# Patient Record
Sex: MALE | Race: ASIAN | NOT HISPANIC OR LATINO
[De-identification: names, ages, dates, MRNs, and addresses within clinical notes are randomized per-mention and may not be internally consistent; named-entity substitution may affect disease eponyms.]

---

## 2020-01-10 ENCOUNTER — APPOINTMENT (OUTPATIENT)
Dept: OPHTHALMOLOGY | Facility: CLINIC | Age: 55
End: 2020-01-10

## 2021-04-07 ENCOUNTER — APPOINTMENT (OUTPATIENT)
Dept: ORTHOPEDIC SURGERY | Facility: CLINIC | Age: 56
End: 2021-04-07
Payer: MEDICAID

## 2021-04-07 VITALS
HEART RATE: 65 BPM | HEIGHT: 66 IN | DIASTOLIC BLOOD PRESSURE: 79 MMHG | WEIGHT: 138 LBS | BODY MASS INDEX: 22.18 KG/M2 | SYSTOLIC BLOOD PRESSURE: 116 MMHG

## 2021-04-07 DIAGNOSIS — M54.16 RADICULOPATHY, LUMBAR REGION: ICD-10-CM

## 2021-04-07 PROBLEM — Z00.00 ENCOUNTER FOR PREVENTIVE HEALTH EXAMINATION: Status: ACTIVE | Noted: 2021-04-07

## 2021-04-07 PROCEDURE — 99203 OFFICE O/P NEW LOW 30 MIN: CPT

## 2021-04-07 PROCEDURE — 99072 ADDL SUPL MATRL&STAF TM PHE: CPT

## 2021-04-16 PROBLEM — M54.16 LUMBAR RADICULOPATHY: Status: ACTIVE | Noted: 2021-04-16

## 2021-04-16 NOTE — PHYSICAL EXAM
[Antalgic] : antalgic [de-identified] : Examination of the lumbar spine reveals no midline tenderness palpation, step-offs, or skin lesions. Decreased range of motion with respect to flexion, extension, lateral bending, and rotation. No tenderness to palpation of the sciatic notch. No tenderness palpation of the bilateral greater trochanters. No pain with passive internal/external rotation of the hips. No instability of bilateral lower extremities.  Negative STEPHENIE. Negative straight leg raise bilaterally. No bowstring. Negative femoral stretch. 5 out of 5 iliopsoas, hip abductors, hips adductors, quadriceps, hamstrings, gastrocsoleus, tibialis anterior, extensor hallucis longus, peroneals. Grossly intact sensation to light touch bilateral lower extremities. 1+ patellar and Achilles reflexes. Downgoing Babinski. No clonus. Intact proprioception. Palpable pulses. No skin lesion and no edema on the right and left lower extremities.

## 2021-04-16 NOTE — DISCUSSION/SUMMARY
[de-identified] : Given his worsening symptoms a lumbar spine MRI will be obtained.  Follow-up afterwards.

## 2021-04-16 NOTE — HISTORY OF PRESENT ILLNESS
[de-identified] : Mr. WILLOW WILSON  is a 56 year old male who presents with 6 weeks of right leg pain that is not improving.  His pain is in his right buttock, lateral thigh, and lateral calf.  He has done 6 weeks of physical therapy without any relief.  Nsaids do not help.  Normal bowel and bladder control.   Denies any recent fevers, chills, sweats, weight loss, or infection.\par \par The patients past medical history, past surgical history, medications, allergies, and social history were reviewed by me today with the patient and documented accordingly.  In addition, the patient's family history, which is noncontributory to their visit, was also reviewed.\par

## 2021-04-21 ENCOUNTER — APPOINTMENT (OUTPATIENT)
Dept: MRI IMAGING | Facility: CLINIC | Age: 56
End: 2021-04-21

## 2022-11-10 ENCOUNTER — APPOINTMENT (OUTPATIENT)
Dept: OPHTHALMOLOGY | Facility: CLINIC | Age: 57
End: 2022-11-10

## 2022-11-10 ENCOUNTER — NON-APPOINTMENT (OUTPATIENT)
Age: 57
End: 2022-11-10

## 2022-11-10 PROCEDURE — 92004 COMPRE OPH EXAM NEW PT 1/>: CPT

## 2023-01-18 ENCOUNTER — EMERGENCY (EMERGENCY)
Facility: HOSPITAL | Age: 58
LOS: 1 days | Discharge: LEFT BEFORE DISCHARGE | End: 2023-01-18
Attending: EMERGENCY MEDICINE | Admitting: EMERGENCY MEDICINE
Payer: COMMERCIAL

## 2023-01-18 VITALS
WEIGHT: 139.99 LBS | SYSTOLIC BLOOD PRESSURE: 126 MMHG | TEMPERATURE: 99 F | RESPIRATION RATE: 17 BRPM | OXYGEN SATURATION: 96 % | HEART RATE: 71 BPM | DIASTOLIC BLOOD PRESSURE: 80 MMHG | HEIGHT: 66 IN

## 2023-01-18 PROCEDURE — 99283 EMERGENCY DEPT VISIT LOW MDM: CPT | Mod: 25

## 2023-01-18 PROCEDURE — 73630 X-RAY EXAM OF FOOT: CPT | Mod: 26,RT

## 2023-01-18 PROCEDURE — 99284 EMERGENCY DEPT VISIT MOD MDM: CPT

## 2023-01-18 PROCEDURE — 73630 X-RAY EXAM OF FOOT: CPT

## 2023-01-18 RX ORDER — IBUPROFEN 200 MG
600 TABLET ORAL ONCE
Refills: 0 | Status: COMPLETED | OUTPATIENT
Start: 2023-01-18 | End: 2023-01-18

## 2023-01-18 RX ADMIN — Medication 600 MILLIGRAM(S): at 18:11

## 2023-01-18 RX ADMIN — Medication 600 MILLIGRAM(S): at 18:41

## 2023-01-18 NOTE — ED ADULT TRIAGE NOTE - CHIEF COMPLAINT QUOTE
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 patient reports he worked the rest of the day as usual, but when he drove home the pain became too severe so he came to the hospital.

## 2023-01-18 NOTE — ED PROVIDER NOTE - OBJECTIVE STATEMENT
57 M c/o right foot pain. Was at work and a jacqui fell onto his heel. At first was feeling okay, finished work. When driving home pain got worse. Denies other injuries. No medication taken for pain.

## 2023-01-18 NOTE — ED PROVIDER NOTE - NSFOLLOWUPINSTRUCTIONS_ED_ALL_ED_FT
Tylenol, ibuprofen.  Rest.  Splint, crutches.  Follow up with podiatrist.  Return for worsening or concerning symptoms.          A clavicle fracture is a break in the long bone that connects your shoulder to your chest wall (clavicle). The clavicle is also called the collarbone. This is a common injury that can happen at any age.      What are the causes?    Common causes of this condition include:  •A hard, direct hit to the shoulder.      •A motor vehicle accident.       •A fall.        What increases the risk?    You are more likely to develop this condition if:  •You are younger than 25 years of age or older than 75 years of age. Most clavicle fractures happen to people who are younger than 25 years of age.      •You are male.      •You play contact sports.        What are the signs or symptoms?    Symptoms of this condition include:  •Pain near the injured shoulder and in the arm.      •Trouble moving the arm on your injured side.      •A shoulder that drops downward and forward.      •Pain when you try to lift the shoulder.      •Bruising, swelling, and tenderness over your shoulder.      •A grinding noise when you try to move the shoulder.      •A bump over your injured shoulder.        How is this treated?    Treatment for this condition depends on the injury.  •If the broken ends of the bone are not out of place, your doctor may put your arm in a sling.      •If the broken ends of the bone are out of place, you may need surgery to put the bones back together.      You may be given medicines for pain.    You may need to do physical therapy exercises to help your shoulder move better and get stronger after your injury has healed.      Follow these instructions at home:    Medicines     •Take over-the-counter and prescription medicines only as told by your doctor.    •Ask your doctor if the medicine prescribed to you:  •Requires you to avoid driving or using machinery.    •Can cause trouble pooping (constipation). You may need to take these actions to prevent or treat trouble pooping:   •Drink enough fluid to keep your pee (urine) pale yellow.      •Take over-the-counter or prescription medicines.      •Eat foods that are high in fiber. These include beans, whole grains, and fresh fruits and vegetables.       •Limit foods that are high in fat and sugars. These include fried or sweet foods.          If you have a sling:     •Wear the sling as told by your doctor. Remove it only as told by your doctor.    •Loosen it if your fingers:  •Tingle.      •Become numb.      •Turn cold and blue.        • Do not lift your arm. Keep it across your chest.      •Keep the sling clean.    •Ask your doctor if you may remove the sling when you take a bath or shower.  •If not, and the sling is not waterproof, do not let it get wet. Cover it with a watertight covering when you take a bath or shower.      •If you may remove it when you take a bath or shower, keep your shoulder in the same position as when the sling is on.          Managing pain, stiffness, and swelling    •If told, put ice on the injured area. To do this:  •If you have a removable sling, remove it as told by your doctor.      •Put ice in a plastic bag.      •Place a towel between your skin and the bag.      •Leave the ice on for 20 minutes, 2–3 times a day.        •Move your fingers often.      •Raise (elevate) the injured area above the level of your heart while you are sitting or lying down.        Activity      •Avoid activities that make your symptoms worse for 4–6 weeks, or as long as told.      • Do not lift anything that is heavier than 10 lb (4.5 kg), or the limit that you are told, until your doctor says that it is safe.      • Do not put weight through your arm on the injured side until your doctor says it is safe. Do not pull or push things or try to support your body weight with that arm.      •Ask your doctor when it is safe for you to drive.      •Do exercises as told by your doctor.      •Return to your normal activities as told by your doctor. Ask your doctor what activities are safe for you.      General instructions     • Do not use any products that contain nicotine or tobacco, such as cigarettes, e-cigarettes, and chewing tobacco. These can delay bone healing. If you need help quitting, ask your doctor.      •Keep all follow-up visits as told by your doctor. This is important.        Contact a doctor if:    •Your medicine is not making you feel less pain.      •Your medicine is not making swelling better.        Get help right away if:    •Your arm is numb. This means that you cannot feel it.      •Your arm is cold.      •Your arm is pale.        Summary    •A clavicle fracture is a break in the long bone that connects your shoulder to your chest wall.      •Treatment depends on whether the broken ends of the bone are out of place or not.      •If you have a sling, wear it as told by your doctor.      •Do exercises when your doctor says you can. The exercises will help your shoulder move better and get stronger.      This information is not intended to replace advice given to you by your health care provider. Make sure you discuss any questions you have with your health care provider.

## 2023-01-18 NOTE — ED PROVIDER NOTE - NS ED ATTENDING STATEMENT MOD
This was a shared visit with the AYAN. I reviewed and verified the documentation and independently performed the documented:

## 2023-01-18 NOTE — ED PROVIDER NOTE - CLINICAL SUMMARY MEDICAL DECISION MAKING FREE TEXT BOX
Patient is a 57-year-old male who presents emergency room with a chief complaint of right foot pain.  Patient reports no significant past medical history.  States he was at work and a jacqui fell onto his foot.  First he felt fine and was able to finish work but as he began to drive home the pain significantly worsened and is increased with weightbearing.  Denies any numbness or tingling to the foot denies additional injury.  He has not taken anything for the symptoms.  On exam patient is sitting in a chair no acute distress right foot: No significant tenderness to palpation to the ankle.  Tenderness to palpation overlying the midfoot sensation grossly intact cap refill less than 2 seconds positive pedal pulse.  Patient presenting to the emergency room with a chief complaint of right foot pain status post blunt trauma.  Neurovascularly intact.  Concern for possible fracture versus ligament injury versus sprain strain.  Will obtain x-ray medicate for pain and monitor.  X-ray of the foot reviewed.  There is a questionable avulsion fracture in that foot.  Patient has overlying pain will place in splints and provide crutches.  At this time patient refusing splint or crutches.  Would like a cane and would like to weight-bear.  Advised that this is not recommended as he could worsen an underlying injury.  Still refusing and will leave AMA.

## 2023-01-18 NOTE — ED PROVIDER NOTE - PATIENT PORTAL LINK FT
You can access the FollowMyHealth Patient Portal offered by [facility] by registering at the following website: [email or web address]. By joining [employer]’s FollowMyHealth portal, you will also be able to view your health information using other applications (apps) compatible with our system.

## 2023-01-18 NOTE — ED PROVIDER NOTE - PROGRESS NOTE DETAILS
Reevaluated patient at bedside.  Patient feeling well. Discussed the results of all diagnostic testing in ED and copies of all available reports given.   An opportunity to ask questions was provided.  Discussed the importance of prompt, close medical follow-up.  Patient will return with any changes, concerns or persistent/worsening symptoms.  Understanding of all instructions verbalized.    unable to input wet read - questionable tarsal fracture - splint, crutches, podiatry f/u pt now refusing splint or crutches. Advise that there is high suspicion for fracture and it is recommended that he be placed in a splint and be non weight bearing until follow up. Still refusing. Advised that if there is an underlying fracture weight bearing may worsen symptoms. Still refusing splint. Will leave AMA

## 2023-01-18 NOTE — ED PROVIDER NOTE - DIFFERENTIAL DIAGNOSIS
Concern for possible fracture versus ligament injury versus sprain strain.  Will obtain x-ray medicate for pain and monitor. Differential Diagnosis

## 2023-01-18 NOTE — ED PROVIDER NOTE - MUSCULOSKELETAL, MLM
TTP 5th metatarsal right foot, no ecchymosis, no swelling, no deformity; +NVI; ankle FROM; skin intact.

## 2023-01-18 NOTE — ED PROVIDER NOTE - CARE PROVIDER_API CALL
Austin Kruse (DPM)  Foot Surgery; Podiatric Medicine; Recon RearfootAnkle Surgery  [address] [place] [address]  Phone: [phone]  Fax: [phone]  Follow Up Time:

## 2023-01-19 ENCOUNTER — EMERGENCY (EMERGENCY)
Facility: HOSPITAL | Age: 58
LOS: 1 days | Discharge: HOME | End: 2023-01-19
Attending: EMERGENCY MEDICINE | Admitting: EMERGENCY MEDICINE
Payer: SELF-PAY

## 2023-01-19 VITALS
HEART RATE: 78 BPM | OXYGEN SATURATION: 98 % | SYSTOLIC BLOOD PRESSURE: 121 MMHG | HEIGHT: 66 IN | WEIGHT: 139.99 LBS | DIASTOLIC BLOOD PRESSURE: 77 MMHG | TEMPERATURE: 98 F | RESPIRATION RATE: 18 BRPM

## 2023-01-19 PROCEDURE — 99283 EMERGENCY DEPT VISIT LOW MDM: CPT | Mod: 25

## 2023-01-19 PROCEDURE — 29515 APPLICATION SHORT LEG SPLINT: CPT | Mod: RT

## 2023-01-19 PROCEDURE — 29515 APPLICATION SHORT LEG SPLINT: CPT

## 2023-01-19 PROCEDURE — 99282 EMERGENCY DEPT VISIT SF MDM: CPT | Mod: 25

## 2023-01-19 NOTE — ED PROVIDER NOTE - OBJECTIVE STATEMENT
58 yo M presents requesting splint and crutches. pt was seen yesterday at  ed and was advised to have a splint and crutches placed but pt refused and left AMA. pt had an accident at work where an object fell on his right foot. pt is c/o same pain to right foot.

## 2023-01-19 NOTE — ED ADULT TRIAGE NOTE - CHIEF COMPLAINT QUOTE
Pt was seen yesterday for right foot injury and was d/c'd but refused cast/brace.  Pt is requesting cast/brace today.

## 2023-01-19 NOTE — ED ADULT NURSE NOTE - OBJECTIVE STATEMENT
10:10-Pt presented to ER, sts he is back to have cast placed o  R foot, dx with Fracture yesterday but left ama before cast was placed, now agreeable to have in done, denies pain/distress, awaiting eval by Provider. CARLOS Magana

## 2023-01-19 NOTE — ED PROVIDER NOTE - PATIENT PORTAL LINK FT
You can access the FollowMyHealth Patient Portal offered by [facility] by registering at the following website: [email or web address]. By joining [employer]’s FollowMyHealth portal, you will also be able to view your health information using other applications (apps) compatible with our system.

## 2023-01-19 NOTE — ED PROCEDURE NOTE - CPROC ED POST RADIOGRAPHY1
post procedure radiography not performed
Instructed patient/caregiver to follow-up with primary care physician./Verbal/written post procedure instructions were given to patient/caregiver./Instructed patient/caregiver regarding signs and symptoms of infection./Keep the cast/splint/dressing clean and dry.

## 2023-01-19 NOTE — ED PROVIDER NOTE - CARE PROVIDER_API CALL
Ja Barrios (DPM)  Podiatric Medicine and Surgery  [address] [place] [address]  Phone: [phone]  Fax: [phone]  Follow Up Time: 4-6 Days

## 2023-01-19 NOTE — ED PROVIDER NOTE - PHYSICAL EXAMINATION
Constitutional: Awake, Alert. NAD. Well appearing, well nourished. Cooperative. VSS.  HEAD: NC/AT; no signs of trauma   EYES: Conjunctiva and sclera are clear bilaterally.   ENT: MMM. No rhinorrhea, normal pharynx, oropharynx patent, no tonsillar exudates or enlargement, no erythema, no drooling or stridor.   NECK: FROM. Supple.   CARDIOVASCULAR: RRR  RESPIRATORY: Speaking full sentences. Normal respiratory effort  ABDOMEN: Soft; NTND.   EXTREMITIES: Full active ROM in all extremities; no deformities; non-tender to palpation; no edema, no crepitus or step off;  distal pulses palpable and symmetric.  SKIN: Warm, dry; good skin turgor, no apparent lesions or rashes, no ecchymosis, brisk capillary refill.  NEURO: AAOx3 follows commands. No facial droop. Steady gait, using cane. Moving all extrem spont

## 2023-01-19 NOTE — ED PROVIDER NOTE - CARE PROVIDERS DIRECT ADDRESSES
[email or web address] in the [email or web address] EOAE (evoked otoacoustic emission)

## 2023-01-19 NOTE — ED PROVIDER NOTE - CLINICAL SUMMARY MEDICAL DECISION MAKING FREE TEXT BOX
58 yo M presents requesting splint and crutches, was at  ED yesterday and advised to have a splint but refused. now is reqestuing it  vss  +nvi b/l feet  nontender right foot    short leg spint placed  dc w pmd and podiatry f/u  Based on the H&P, I do not suspect any life- / limb- threatening pathology that requires further intervention at this time.

## 2023-12-06 NOTE — ED ADULT TRIAGE NOTE - IDEAL BODY WEIGHT(KG)
Is This A New Presentation, Or A Follow-Up?: Skin Lesions How Severe Is Your Skin Lesion?: moderate Have Your Skin Lesions Been Treated?: not been treated Additional History: New patient. 62

## 2024-08-23 PROBLEM — Z78.9 OTHER SPECIFIED HEALTH STATUS: Chronic | Status: ACTIVE | Noted: 2023-01-18

## 2024-09-03 ENCOUNTER — EMERGENCY (EMERGENCY)
Facility: HOSPITAL | Age: 59
LOS: 1 days | Discharge: HOME | End: 2024-09-03
Attending: STUDENT IN AN ORGANIZED HEALTH CARE EDUCATION/TRAINING PROGRAM
Payer: COMMERCIAL

## 2024-09-03 VITALS
SYSTOLIC BLOOD PRESSURE: 115 MMHG | DIASTOLIC BLOOD PRESSURE: 74 MMHG | HEIGHT: 66 IN | HEART RATE: 76 BPM | TEMPERATURE: 98 F | RESPIRATION RATE: 18 BRPM | OXYGEN SATURATION: 98 % | WEIGHT: 141.1 LBS

## 2024-09-03 VITALS
SYSTOLIC BLOOD PRESSURE: 116 MMHG | DIASTOLIC BLOOD PRESSURE: 77 MMHG | OXYGEN SATURATION: 100 % | TEMPERATURE: 98 F | HEART RATE: 50 BPM | RESPIRATION RATE: 18 BRPM

## 2024-09-03 PROCEDURE — 99284 EMERGENCY DEPT VISIT MOD MDM: CPT

## 2024-09-03 PROCEDURE — 73630 X-RAY EXAM OF FOOT: CPT

## 2024-09-03 PROCEDURE — 99283 EMERGENCY DEPT VISIT LOW MDM: CPT

## 2024-09-03 PROCEDURE — 73630 X-RAY EXAM OF FOOT: CPT | Mod: 26,RT

## 2024-09-03 RX ORDER — IBUPROFEN 600 MG
600 TABLET ORAL ONCE
Refills: 0 | Status: COMPLETED | OUTPATIENT
Start: 2024-09-03 | End: 2024-09-03

## 2024-09-03 RX ADMIN — Medication 600 MILLIGRAM(S): at 14:55

## 2024-09-03 NOTE — ED PROVIDER NOTE - CLINICAL SUMMARY MEDICAL DECISION MAKING FREE TEXT BOX
right foot pain, atraumatic  has had h/o FX in same foot x 1 year ago, injury with pole in construction accident  check XR  NSAIDS  outpatient f/u with podiatry

## 2024-09-03 NOTE — ED PROVIDER NOTE - PATIENT PORTAL LINK FT
You can access the FollowMyHealth Patient Portal offered by [facility] by registering at the following website: [email or web address]. By joining [employer]’s FollowMyHealth portal, you will also be able to view your health information using other applications (apps) compatible with our system.

## 2024-09-03 NOTE — ED PROVIDER NOTE - CARE PROVIDER_API CALL
Abel Davis  Podiatric Medicine and Surgery  [address] [place] [id number]  Phone: [phone]  Fax: [phone]  Follow Up Time:

## 2024-09-03 NOTE — ED PROVIDER NOTE - PHYSICAL EXAMINATION
Constitutional - well-developed; well nourished. Head - NCAT. Airway patent. Eyes - PERRL. CV - RRR. no murmur. no edema. Pulm - CTAB. Abd - soft, nt. no rebound. no guarding. Neuro - A&Ox3. strength 5/5 x4. sensation intact x4. normal gait. Skin - No rash. MSK - +TTP to anterior mid foot, no erythema, no warmth, no swelling, no echymosis, NTTP calf or knee, compartments soft NT, normal ROM.

## 2024-09-03 NOTE — ED ADULT NURSE NOTE - OBJECTIVE STATEMENT
Orders   OUTPATIENT DIAGNOSTIC TESTING  ULTRASOUND OF THE ABDOMEN COMPLETE  DIAGNOSIS: ABDOMINAL TENDERNESS, LLQ R10.814, BLOATING R14.0           [address] [address]  [phone] (p)  [phone] (f)           1 VISIT.  Signature   Electronically signed by : Dariela Welch ; 07/26/2017 12:32 PM CST; Co-participant.  Electronically signed by : BRUCE BERNHEIM M.D.; 07/26/2017 12:41 PM CST.   assumed care of pt at 1415. c/o foot pain since last year.. denies numbness or tingling. pt educated on plan of care, pt able to successfully teach back plan of care to RN, RN will continue to reeducate pt during hospital stay.

## 2024-09-03 NOTE — ED ADULT TRIAGE NOTE - CHIEF COMPLAINT QUOTE
pt c/o of R foot pain. pt states he had an injury last year and recently thje foot pain started aga8in. pt states the pain comes and goes but woke up today and was "bad" pt ambulatory in ED

## 2024-09-03 NOTE — ED PROVIDER NOTE - OBJECTIVE STATEMENT
This a 59 year old male here for right foot pain evaluation.  He notes had injury with pole in construction 1 year ago and was told had fracture and LE was placed in splint.  He notes no trauma or falls to affected foot recently.  He denies taking anything for pain.  He denies any calf pain, knee pain or hip pain.  He denies any SOB.  He denies any pmhx or shx.

## 2024-09-03 NOTE — ED PROVIDER NOTE - ATTENDING APP SHARED VISIT CONTRIBUTION OF CARE
I, Daniel Yu, evaluated the patient with the PA, and completed the key components of the history and physical exam. I then discussed the management plan with the PA.

## 2024-09-03 NOTE — ED PROVIDER NOTE - NS ED ROS FT
No fever/chills, No photophobia/eye pain/changes in vision, No ear pain/sore throat/dysphagia, No chest pain/palpitations, no SOB/cough/wheeze/stridor, No abdominal pain, No N/V/D, no dysuria/frequency/discharge, +right foot pain, No neck/back pain, no rash, no changes in neurological status/function.

## 2025-01-20 ENCOUNTER — EMERGENCY (EMERGENCY)
Facility: HOSPITAL | Age: 60
LOS: 1 days | Discharge: HOME | End: 2025-01-20
Attending: STUDENT IN AN ORGANIZED HEALTH CARE EDUCATION/TRAINING PROGRAM | Admitting: STUDENT IN AN ORGANIZED HEALTH CARE EDUCATION/TRAINING PROGRAM
Payer: MEDICAID

## 2025-01-20 VITALS
DIASTOLIC BLOOD PRESSURE: 80 MMHG | TEMPERATURE: 98 F | HEIGHT: 67 IN | OXYGEN SATURATION: 98 % | WEIGHT: 139.99 LBS | RESPIRATION RATE: 18 BRPM | SYSTOLIC BLOOD PRESSURE: 121 MMHG | HEART RATE: 78 BPM

## 2025-01-20 VITALS
HEART RATE: 77 BPM | DIASTOLIC BLOOD PRESSURE: 84 MMHG | OXYGEN SATURATION: 98 % | SYSTOLIC BLOOD PRESSURE: 123 MMHG | RESPIRATION RATE: 17 BRPM | TEMPERATURE: 98 F

## 2025-01-20 PROCEDURE — 99283 EMERGENCY DEPT VISIT LOW MDM: CPT | Mod: 25

## 2025-01-20 PROCEDURE — 73564 X-RAY EXAM KNEE 4 OR MORE: CPT

## 2025-01-20 PROCEDURE — 73564 X-RAY EXAM KNEE 4 OR MORE: CPT | Mod: 26,RT

## 2025-01-20 PROCEDURE — 99284 EMERGENCY DEPT VISIT MOD MDM: CPT

## 2025-01-20 RX ORDER — IBUPROFEN 200 MG
600 TABLET ORAL ONCE
Refills: 0 | Status: COMPLETED | OUTPATIENT
Start: 2025-01-20 | End: 2025-01-20

## 2025-01-20 RX ORDER — IBUPROFEN 200 MG
1 TABLET ORAL
Qty: 21 | Refills: 0
Start: 2025-01-20 | End: 2025-01-26

## 2025-01-20 RX ADMIN — Medication 600 MILLIGRAM(S): at 15:14

## 2025-01-20 RX ADMIN — Medication 600 MILLIGRAM(S): at 14:48

## 2025-01-20 NOTE — ED PROVIDER NOTE - CARE PROVIDER_API CALL
Sam Goodwin  Orthopaedic Surgery  [address] [place] [id number]  Phone: [phone]  Fax: [phone]  Follow Up Time:

## 2025-01-20 NOTE — ED ADULT NURSE NOTE - OBJECTIVE STATEMENT
Pt ambulatory to ED c/o right knee pain. Pt states the pain started yesterday and has worsened to today./ Pt denies any trauma to area, but states he had a right foot injury x2 years ago. Pt denies any numbness tingling to right leg, chest pain, SOB, n/v/d fever or chills. No swelling, warmth or redness noted to knee. Pt remains resting in stretcher.

## 2025-01-20 NOTE — ED PROVIDER NOTE - CLINICAL SUMMARY MEDICAL DECISION MAKING FREE TEXT BOX
patient is a 59-year-old male with no significant past medical history presents emergency department for right knee pain.  He states this been going on since yesterday.  Patient works as a .  This is never happened before.  Denies fever, chills.  Patient has been ambulating with a cane since yesterday secondary to pain in the knee.  Patient is able to range the knee.  Denies numbness, weakness.  Denies erythema.    General: well appearing, no acute distress, appropriate weight  Cardiac: heart RRR, no murmurs, rubs, gallops, pulses 2+ x4  Pulm: lungs CTA sensation intact, strength 5/5  Skin: warm, dry, no rash, laceration, abrasion, contusion  MSK: R knee swelling. no erythema or warmth. FROM. distal sensation intact. distal pulse 2+. neg anterior-posterior drawer test. neg varus/valgus test.    will obtain xray and mediate for pain. may require arthrocentesis.

## 2025-01-20 NOTE — ED PROVIDER NOTE - CARE PROVIDERS DIRECT ADDRESSES
[email or web address]

## 2025-01-20 NOTE — ED PROVIDER NOTE - DIFFERENTIAL DIAGNOSIS
DDx includes but not limited to: Bursitis VS fracture VS less likely gout.  Do not suspect septic joint Differential Diagnosis

## 2025-01-20 NOTE — ED PROVIDER NOTE - NSFOLLOWUPINSTRUCTIONS_ED_ALL_ED_FT
Follow up with orthopedics  rest ice elevate compress  take ibuprofen for pain     Knee Pain    WHAT YOU NEED TO KNOW:    Knee pain may start suddenly, or it may be a long-term problem. You may have pain on the side, front, or back of your knee. You may have knee stiffness and swelling. You may hear popping sounds or feel like your knee is giving way or locking up as you walk. You may feel pain when you sit, stand, walk, or climb up and down stairs. Knee pain can be caused by conditions such as obesity, inflammation, or strains or tears in ligaments or tendons.    DISCHARGE INSTRUCTIONS:    Return to the emergency department if:    Your pain is worse, even after treatment.    You cannot bend or straighten your leg completely.    The swelling around your knee does not go down even with treatment.    Your knee is painful and hot to the touch.  Contact your healthcare provider if:    You have questions or concerns about your condition or care.    Medicines: You may need any of the following:    NSAIDs help decrease swelling and pain or fever. This medicine is available with or without a doctor's order. NSAIDs can cause stomach bleeding or kidney problems in certain people. If you take blood thinner medicine, always ask your healthcare provider if NSAIDs are safe for you. Always read the medicine label and follow directions.    Acetaminophen decreases pain and fever. It is available without a doctor's order. Ask how much to take and how often to take it. Follow directions. Read the labels of all other medicines you are using to see if they also contain acetaminophen, or ask your doctor or pharmacist. Acetaminophen can cause liver damage if not taken correctly.    Prescription pain medicine may be given. Ask your healthcare provider how to take this medicine safely. Some prescription pain medicines contain acetaminophen. Do not take other medicines that contain acetaminophen without talking to your healthcare provider. Too much acetaminophen may cause liver damage. Prescription pain medicine may cause constipation. Ask your healthcare provider how to prevent or treat constipation.    Take your medicine as directed. Contact your healthcare provider if you think your medicine is not helping or if you have side effects. Tell your provider if you are allergic to any medicine. Keep a list of the medicines, vitamins, and herbs you take. Include the amounts, and when and why you take them. Bring the list or the pill bottles to follow-up visits. Carry your medicine list with you in case of an emergency.  What you can do to manage your symptoms:    Rest your knee so it can heal. Limit activities that increase your pain. Do low-impact exercises, such as walking or swimming.    Apply ice to help reduce swelling and pain. Use an ice pack, or put crushed ice in a plastic bag. Cover it with a towel before you apply it to your knee. Apply ice for 15 to 20 minutes every hour, or as directed.    Apply compression to help reduce swelling. Use a brace or bandage only as directed.    Elevate your knee to help decrease pain and swelling. Elevate your knee while you are sitting or lying down. Prop your leg on pillows to keep your knee above the level of your heart.    Prevent your knee from moving as directed. Your healthcare provider may put on a cast or splint. You may need to wear a leg brace to stabilize your knee. A leg brace can be adjusted to increase your range of motion as your knee heals.  Hinged Knee Braces   What you can do to prevent knee pain:    Maintain a healthy weight. Extra weight increases your risk for knee pain. Ask your healthcare provider how much you should weigh. He or she can help you create a safe weight loss plan if you need to lose weight.    Exercise or train properly. Use the correct equipment for sports. Wear shoes that provide good support. Check your posture often as you exercise, play sports, or train for an event. This can help prevent stress and strain on your knees. Rest between sessions so you do not overwork your knees.  Follow up with your healthcare provider within 24 hours or as directed: You may need follow-up treatments, such as steroid injections to decrease pain. Write down your questions so you remember to ask them during your visits.

## 2025-01-20 NOTE — ED PROVIDER NOTE - PATIENT PORTAL LINK FT
You can access the FollowMyHealth Patient Portal offered by [facility] by registering at the following website: [email or web address]. By joining [employer]’s FollowMyHealth portal, you will also be able to view your health information using other applications (apps) compatible with our system.

## 2025-01-20 NOTE — ED PROVIDER NOTE - OBJECTIVE STATEMENT
Patient is a 59-year-old otherwise healthy male coming in complaining of right knee pain since yesterday.  Patient states he works as a  went for a walk yesterday was fine and symptoms started after that.  Patient has been using a cane denies falling twisting numbness tingling weakness chest pain shortness of breath.  Patient denies taking any for pain.

## 2025-01-20 NOTE — ED PROVIDER NOTE - PROGRESS NOTE DETAILS
Dr. Stevenson Hyman  Trace fluid around the right knee on POCUS.  Patient is feeling better after ibuprofen.  X-ray unremarkable.  Will discharge patient home with follow-up to PCP, orthopedics and give return precautions.

## 2025-01-26 PROBLEM — M25.561 RIGHT KNEE PAIN, UNSPECIFIED CHRONICITY: Status: ACTIVE | Noted: 2025-01-26

## 2025-01-27 ENCOUNTER — APPOINTMENT (OUTPATIENT)
Dept: ORTHOPEDIC SURGERY | Facility: CLINIC | Age: 60
End: 2025-01-27
Payer: MEDICAID

## 2025-01-27 VITALS
DIASTOLIC BLOOD PRESSURE: 78 MMHG | BODY MASS INDEX: 22.18 KG/M2 | WEIGHT: 138 LBS | HEIGHT: 66 IN | SYSTOLIC BLOOD PRESSURE: 125 MMHG

## 2025-01-27 PROBLEM — M17.11 ARTHRITIS OF KNEE, RIGHT: Status: ACTIVE | Noted: 2025-01-27

## 2025-01-27 PROCEDURE — 73564 X-RAY EXAM KNEE 4 OR MORE: CPT | Mod: RT

## 2025-01-27 PROCEDURE — 99204 OFFICE O/P NEW MOD 45 MIN: CPT | Mod: 25

## 2025-01-27 PROCEDURE — 20610 DRAIN/INJ JOINT/BURSA W/O US: CPT | Mod: RT

## 2025-01-27 RX ORDER — MELOXICAM 15 MG/1
15 TABLET ORAL
Qty: 30 | Refills: 0 | Status: ACTIVE | COMMUNITY
Start: 2025-01-27 | End: 1900-01-01

## 2025-02-05 ENCOUNTER — APPOINTMENT (OUTPATIENT)
Dept: ORTHOPEDIC SURGERY | Facility: CLINIC | Age: 60
End: 2025-02-05
Payer: MEDICAID

## 2025-02-05 VITALS
HEART RATE: 63 BPM | WEIGHT: 138 LBS | DIASTOLIC BLOOD PRESSURE: 81 MMHG | HEIGHT: 66 IN | BODY MASS INDEX: 22.18 KG/M2 | SYSTOLIC BLOOD PRESSURE: 119 MMHG

## 2025-02-05 DIAGNOSIS — M17.11 UNILATERAL PRIMARY OSTEOARTHRITIS, RIGHT KNEE: ICD-10-CM

## 2025-02-05 PROCEDURE — G2211 COMPLEX E/M VISIT ADD ON: CPT | Mod: NC

## 2025-02-05 PROCEDURE — 99213 OFFICE O/P EST LOW 20 MIN: CPT

## 2025-02-13 ENCOUNTER — APPOINTMENT (OUTPATIENT)
Dept: MRI IMAGING | Facility: CLINIC | Age: 60
End: 2025-02-13
Payer: MEDICAID

## 2025-02-13 ENCOUNTER — OUTPATIENT (OUTPATIENT)
Dept: OUTPATIENT SERVICES | Facility: HOSPITAL | Age: 60
LOS: 1 days | End: 2025-02-13

## 2025-02-13 DIAGNOSIS — M17.11 UNILATERAL PRIMARY OSTEOARTHRITIS, RIGHT KNEE: ICD-10-CM

## 2025-02-13 PROCEDURE — 73721 MRI JNT OF LWR EXTRE W/O DYE: CPT | Mod: 26,RT

## 2025-03-10 ENCOUNTER — APPOINTMENT (OUTPATIENT)
Dept: ORTHOPEDIC SURGERY | Facility: CLINIC | Age: 60
End: 2025-03-10
Payer: MEDICAID

## 2025-03-10 VITALS — WEIGHT: 138 LBS | HEIGHT: 66 IN | BODY MASS INDEX: 22.18 KG/M2

## 2025-03-10 DIAGNOSIS — S83.289A OTHER TEAR OF LATERAL MENISCUS, CURRENT INJURY, UNSPECIFIED KNEE, INITIAL ENCOUNTER: ICD-10-CM

## 2025-03-10 PROCEDURE — 99213 OFFICE O/P EST LOW 20 MIN: CPT

## 2025-03-10 PROCEDURE — G2211 COMPLEX E/M VISIT ADD ON: CPT | Mod: NC

## 2025-04-06 ENCOUNTER — EMERGENCY (EMERGENCY)
Facility: HOSPITAL | Age: 60
LOS: 1 days | End: 2025-04-06
Attending: STUDENT IN AN ORGANIZED HEALTH CARE EDUCATION/TRAINING PROGRAM
Payer: COMMERCIAL

## 2025-04-06 VITALS
SYSTOLIC BLOOD PRESSURE: 120 MMHG | TEMPERATURE: 98 F | OXYGEN SATURATION: 99 % | DIASTOLIC BLOOD PRESSURE: 71 MMHG | RESPIRATION RATE: 18 BRPM | WEIGHT: 145.06 LBS | HEART RATE: 61 BPM

## 2025-04-06 VITALS
SYSTOLIC BLOOD PRESSURE: 118 MMHG | HEART RATE: 64 BPM | OXYGEN SATURATION: 99 % | RESPIRATION RATE: 18 BRPM | TEMPERATURE: 98 F | DIASTOLIC BLOOD PRESSURE: 70 MMHG

## 2025-04-06 PROCEDURE — 99284 EMERGENCY DEPT VISIT MOD MDM: CPT

## 2025-04-06 PROCEDURE — 99284 EMERGENCY DEPT VISIT MOD MDM: CPT | Mod: 25

## 2025-04-06 PROCEDURE — 72131 CT LUMBAR SPINE W/O DYE: CPT | Mod: 26

## 2025-04-06 PROCEDURE — 72131 CT LUMBAR SPINE W/O DYE: CPT | Mod: MC

## 2025-04-06 RX ORDER — ACETAMINOPHEN 500 MG/5ML
650 LIQUID (ML) ORAL ONCE
Refills: 0 | Status: COMPLETED | OUTPATIENT
Start: 2025-04-06 | End: 2025-04-06

## 2025-04-06 RX ORDER — LIDOCAINE HYDROCHLORIDE 20 MG/ML
1 JELLY TOPICAL ONCE
Refills: 0 | Status: COMPLETED | OUTPATIENT
Start: 2025-04-06 | End: 2025-04-06

## 2025-04-06 RX ORDER — METHOCARBAMOL 500 MG/1
1500 TABLET, FILM COATED ORAL ONCE
Refills: 0 | Status: COMPLETED | OUTPATIENT
Start: 2025-04-06 | End: 2025-04-06

## 2025-04-06 RX ORDER — METHOCARBAMOL 500 MG/1
2 TABLET, FILM COATED ORAL
Qty: 12 | Refills: 0
Start: 2025-04-06 | End: 2025-04-07

## 2025-04-06 RX ORDER — IBUPROFEN 200 MG
400 TABLET ORAL ONCE
Refills: 0 | Status: COMPLETED | OUTPATIENT
Start: 2025-04-06 | End: 2025-04-06

## 2025-04-06 RX ADMIN — LIDOCAINE HYDROCHLORIDE 1 PATCH: 20 JELLY TOPICAL at 17:58

## 2025-04-06 RX ADMIN — Medication 650 MILLIGRAM(S): at 17:56

## 2025-04-06 RX ADMIN — METHOCARBAMOL 1500 MILLIGRAM(S): 500 TABLET, FILM COATED ORAL at 17:57

## 2025-04-06 RX ADMIN — Medication 400 MILLIGRAM(S): at 17:57

## 2025-04-06 NOTE — ED PROVIDER NOTE - OBJECTIVE STATEMENT
60-year-old male no significant past medical history presents with 2 days of diffuse lower back pain.  Patient states he went fishing was helping his wife clean the fish on the floor and he was sitting for about 1/2-hour and when he stood up he noticed severe pain in his back.  Is able to ambulate but with pain.  No bowel or bladder incontinence, no saddle anesthesia, no numbness tingling weakness, no fevers.  Has not taken anything for pain at home.

## 2025-04-06 NOTE — ED PROVIDER NOTE - NSFOLLOWUPINSTRUCTIONS_ED_ALL_ED_FT
Please follow-up with the spine clinic as below within 24 to 48 hours.      You may take ACETAMINOPHEN and IBUPROFEN as directed on packaging. Always follow medication instructions and read medication side effects. Stop using these medications if you have any concerns You may take Robaxin as needed for breakthrough pain, 2 tabs every 8 hours as needed for 2 days.  This may make you sleepy so please do not drive or operate machinery while taking this medication.    Please return to the emergency department for worsening back pain numbness tingling weakness difficulty with your bowels or bladder difficulty walking numbness in the groin if you get worse in any way    Back Pain    AMBULATORY CARE:    Back pain is common. You may have back pain and muscle spasms. You may feel sore or stiff on one or both sides of your back. The pain may spread to your lower body. Conditions that affect the spine, joints, or muscles can cause back pain. These may include arthritis, spinal stenosis (narrowing of the spinal column), muscle tension, or breakdown of the spinal discs.    Call your local emergency number (911 in the ) if:    You have severe back pain with chest pain.    You cannot control your urine or bowel movements.    Your pain becomes so severe that you cannot walk.  Seek care immediately if:    You have pain, numbness, or weakness in one or both legs.    You have severe back pain, nausea, and vomiting.    You have severe back pain that spreads to your side or genital area.  Call your doctor if:    You have back pain that does not get better with rest and pain medicine.    You have a fever.    You have pain that worsens when you are on your back or when you rest.    You have pain that worsens when you cough or sneeze.    You lose weight without trying.    You have questions or concerns about your condition or care.  Medicines: You may need any of the following:    NSAIDs help decrease swelling and pain or fever. This medicine is available with or without a doctor's order. NSAIDs can cause stomach bleeding or kidney problems in certain people. If you take blood thinner medicine, always ask your healthcare provider if NSAIDs are safe for you. Always read the medicine label and follow directions.    Acetaminophen decreases pain and fever. It is available without a doctor's order. Ask how much to take and how often to take it. Follow directions. Read the labels of all other medicines you are using to see if they also contain acetaminophen, or ask your doctor or pharmacist. Acetaminophen can cause liver damage if not taken correctly.    Muscle relaxers help decrease muscle spasms and back pain.    Take your medicine as directed. Contact your healthcare provider if you think your medicine is not helping or if you have side effects. Tell your provider if you are allergic to any medicine. Keep a list of the medicines, vitamins, and herbs you take. Include the amounts, and when and why you take them. Bring the list or the pill bottles to follow-up visits. Carry your medicine list with you in case of an emergency.  Manage your back pain:    Apply ice on your back for 15 to 20 minutes every hour or as directed. Use an ice pack, or put crushed ice in a plastic bag. Cover it with a towel before you apply it to your skin. Ice helps prevent tissue damage and decreases pain.    Apply heat on your back for 20 to 30 minutes every 2 hours for as many days as directed. Heat helps decrease pain and muscle spasms.    Stay active as much as you can without causing more pain. Bed rest could make your back pain worse. Avoid heavy lifting until your pain is gone.  [facility] Walking for Exercise      Go to physical therapy as directed. A physical therapist can teach you exercises to help improve movement and strength, and to decrease pain.  Follow up with your doctor in 2 weeks, or as directed: You might need to see a specialist. Write down your questions so you remember to ask them during your visits.

## 2025-04-06 NOTE — ED ADULT NURSE NOTE - TEMPLATE
Your total cholesterol and one of your bad cholesterols are elevated and I usually like to try lifestyle changes before meds like:  Decrease your intake of red meat and increase your intake of fatty fish such as salmon, mackerel and sardines. Increase fiber in your diet. Decrease your intake of full fat dairy products such as milk and yogurt and try to incorporate at least 30 minutes of exercise most days of the week and loosing weight helps everything. Your other labs are basically normal.  Some values may be minimally outside the  \"normal\" range but are not harmful or clinically significant. Please contact the office if you have questions or concerns.  We will recheck all your labs at your next office visit Back Pain

## 2025-04-06 NOTE — ED PROVIDER NOTE - PROVIDER TOKENS
PROVIDER:[TOKEN:[[id number]],FOLLOWUP:[1-3 Days]],PROVIDER:[TOKEN:[[id number]],FOLLOWUP:[1-3 Days]]

## 2025-04-06 NOTE — ED ADULT NURSE NOTE - OBJECTIVE STATEMENT
Pt AOx4, breathing even and unlabored. Assumed care 1604. Pt presents to ED from [facility] c/o generalized back pain. Pt states acute pain x3 days after helping wife clean fish off the floor and was unable to stand up after; denies falls/trauma. No new complaints at this time. Pt in NAD.

## 2025-04-06 NOTE — ED PROVIDER NOTE - CLINICAL SUMMARY MEDICAL DECISION MAKING FREE TEXT BOX
History and physical as noted above.  No red flags for back pain except some tenderness at the midline with palpation to lumbosacral region but also tender to paraspinal regions b/l.  Therefore will get CT imaging, symptom control.  Dispo and further interventions pending History and physical as noted above.  No red flags for back pain except some tenderness at the midline with palpation to lumbosacral region but also tender to paraspinal regions b/l.  Therefore will get CT imaging, symptom control.  Dispo and further interventions pending    ct results discussed with patient, now ambulating throughout the ED, requesting to go home. states pain has improved. robaxin sent to pharmacy, side effects discussed. patient stable for dc with outpatient spine followup

## 2025-04-06 NOTE — ED ADULT TRIAGE NOTE - CHIEF COMPLAINT QUOTE
pt c/o lower back pain since Thursday, pt denies any falls, was helping wife clean fish on the floor and pt was unable to stand up after. denies nay known pmhx. no meds PTA

## 2025-04-06 NOTE — ED PROVIDER NOTE - PATIENT PORTAL LINK FT
You can access the FollowMyHealth Patient Portal offered by [facility] by registering at the following website: [email or web address]. By joining [employer]’s FollowMyHealth portal, you will also be able to view your health information using other applications (apps) compatible with our system.

## 2025-04-06 NOTE — ED PROVIDER NOTE - CARE PROVIDER_API CALL
Yossi Pro  Follow Up Time: 1-3 Days    Yossi Pro  Neurosurgery  [address] [place] [id number]  Phone: [phone]  Fax: [phone]  Follow Up Time: 1-3 Days

## 2025-04-06 NOTE — ED PROVIDER NOTE - PHYSICAL EXAMINATION
PHYSICAL EXAM:   General: well-appearing, appears stated age, not in extremis   HEENT: NC/AT, airway patent  Cardiovascular: regular rate   Respiratory: , nonlabored respirations, saturating well on RA  Abdominal: soft, nontender, nondistended, no rebound, guarding or rigidity  Back: diffuse lower lumbosacral spine ttp including at midline, no overlying skin lesions  Extremities: +straight leg raise approx 45-60 degrees on right.   Neuro: Strength 5 out of 5 in bilateral lower extremities except effort limited secondary to pain, 5 out of 5 in plantar and dorsiflexion of the feet as well, sensation intact to light touch in bilateral lower extremities.  Psychiatric: appropriate mood and affect.   -Sapna Vaz MD Attending Physician

## 2025-04-08 ENCOUNTER — APPOINTMENT (OUTPATIENT)
Age: 60
End: 2025-04-08
Payer: MEDICAID

## 2025-04-08 ENCOUNTER — NON-APPOINTMENT (OUTPATIENT)
Age: 60
End: 2025-04-08

## 2025-04-08 VITALS
SYSTOLIC BLOOD PRESSURE: 117 MMHG | HEART RATE: 61 BPM | TEMPERATURE: 97.7 F | BODY MASS INDEX: 21.97 KG/M2 | WEIGHT: 140 LBS | HEIGHT: 67 IN | OXYGEN SATURATION: 95 % | DIASTOLIC BLOOD PRESSURE: 73 MMHG

## 2025-04-08 DIAGNOSIS — Z78.9 OTHER SPECIFIED HEALTH STATUS: ICD-10-CM

## 2025-04-08 DIAGNOSIS — Z80.9 FAMILY HISTORY OF MALIGNANT NEOPLASM, UNSPECIFIED: ICD-10-CM

## 2025-04-08 DIAGNOSIS — Z87.891 PERSONAL HISTORY OF NICOTINE DEPENDENCE: ICD-10-CM

## 2025-04-08 DIAGNOSIS — M54.50 LOW BACK PAIN, UNSPECIFIED: ICD-10-CM

## 2025-04-08 PROCEDURE — 99203 OFFICE O/P NEW LOW 30 MIN: CPT

## 2025-04-24 ENCOUNTER — TRANSCRIPTION ENCOUNTER (OUTPATIENT)
Age: 60
End: 2025-04-24

## 2025-05-09 ENCOUNTER — APPOINTMENT (OUTPATIENT)
Dept: NEUROSURGERY | Facility: CLINIC | Age: 60
End: 2025-05-09
Payer: MEDICAID

## 2025-05-09 VITALS
HEART RATE: 62 BPM | BODY MASS INDEX: 21.97 KG/M2 | HEIGHT: 67 IN | TEMPERATURE: 98.2 F | OXYGEN SATURATION: 98 % | WEIGHT: 140 LBS | SYSTOLIC BLOOD PRESSURE: 116 MMHG | DIASTOLIC BLOOD PRESSURE: 75 MMHG

## 2025-05-09 DIAGNOSIS — Z87.891 PERSONAL HISTORY OF NICOTINE DEPENDENCE: ICD-10-CM

## 2025-05-09 DIAGNOSIS — Z78.9 OTHER SPECIFIED HEALTH STATUS: ICD-10-CM

## 2025-05-09 DIAGNOSIS — Z80.9 FAMILY HISTORY OF MALIGNANT NEOPLASM, UNSPECIFIED: ICD-10-CM

## 2025-05-09 PROCEDURE — 99214 OFFICE O/P EST MOD 30 MIN: CPT

## 2025-05-09 RX ORDER — LIDOCAINE 40 MG/G
4 PATCH TOPICAL
Qty: 10 | Refills: 1 | Status: ACTIVE | COMMUNITY
Start: 2025-05-09 | End: 1900-01-01

## 2025-07-08 ENCOUNTER — APPOINTMENT (OUTPATIENT)
Dept: NEUROSURGERY | Facility: CLINIC | Age: 60
End: 2025-07-08
Payer: MEDICAID

## 2025-07-08 VITALS
TEMPERATURE: 98.6 F | OXYGEN SATURATION: 97 % | HEIGHT: 67 IN | DIASTOLIC BLOOD PRESSURE: 75 MMHG | SYSTOLIC BLOOD PRESSURE: 107 MMHG | WEIGHT: 140 LBS | HEART RATE: 71 BPM | BODY MASS INDEX: 21.97 KG/M2

## 2025-07-08 PROCEDURE — 99214 OFFICE O/P EST MOD 30 MIN: CPT
